# Patient Record
Sex: MALE | Race: WHITE | NOT HISPANIC OR LATINO | URBAN - METROPOLITAN AREA
[De-identification: names, ages, dates, MRNs, and addresses within clinical notes are randomized per-mention and may not be internally consistent; named-entity substitution may affect disease eponyms.]

---

## 2019-08-15 ENCOUNTER — TELEPHONE (OUTPATIENT)
Dept: NEUROLOGY | Facility: CLINIC | Age: 59
End: 2019-08-15

## 2023-08-01 ENCOUNTER — OFFICE VISIT (OUTPATIENT)
Age: 63
End: 2023-08-01
Payer: COMMERCIAL

## 2023-08-01 VITALS
HEART RATE: 63 BPM | BODY MASS INDEX: 33.9 KG/M2 | HEIGHT: 67 IN | SYSTOLIC BLOOD PRESSURE: 130 MMHG | OXYGEN SATURATION: 97 % | DIASTOLIC BLOOD PRESSURE: 82 MMHG | WEIGHT: 216 LBS

## 2023-08-01 DIAGNOSIS — R15.1 FECAL SOILING: ICD-10-CM

## 2023-08-01 DIAGNOSIS — L29.0 PRURITUS ANI: Primary | ICD-10-CM

## 2023-08-01 PROCEDURE — 99213 OFFICE O/P EST LOW 20 MIN: CPT | Performed by: COLON & RECTAL SURGERY

## 2023-08-01 RX ORDER — OMEPRAZOLE 40 MG/1
40 CAPSULE, DELAYED RELEASE ORAL DAILY
COMMUNITY
Start: 2023-07-27

## 2023-08-01 RX ORDER — DOXEPIN HYDROCHLORIDE 10 MG/1
CAPSULE ORAL
COMMUNITY
Start: 2023-06-28

## 2023-08-01 RX ORDER — ZOLPIDEM TARTRATE 10 MG/1
TABLET ORAL
COMMUNITY
Start: 2023-06-23

## 2023-08-01 RX ORDER — CITALOPRAM HYDROBROMIDE 10 MG/1
TABLET ORAL
COMMUNITY
Start: 2023-07-30

## 2023-08-01 RX ORDER — AZELASTINE HYDROCHLORIDE 137 UG/1
SPRAY, METERED NASAL
COMMUNITY
Start: 2023-06-23

## 2023-08-01 RX ORDER — ASPIRIN 81 MG/1
TABLET ORAL
COMMUNITY

## 2023-08-01 RX ORDER — MONTELUKAST SODIUM 10 MG/1
10 TABLET ORAL EVERY EVENING
COMMUNITY
Start: 2023-06-23

## 2023-08-01 RX ORDER — TRIAMCINOLONE ACETONIDE 1 MG/G
CREAM TOPICAL 2 TIMES DAILY
Qty: 30 G | Refills: 0 | Status: SHIPPED | OUTPATIENT
Start: 2023-08-01

## 2023-08-01 RX ORDER — METHYLCELLULOSE 500 MG/1
TABLET ORAL
COMMUNITY

## 2023-08-01 RX ORDER — LOSARTAN POTASSIUM 25 MG/1
25 TABLET ORAL EVERY MORNING
COMMUNITY
Start: 2023-06-23

## 2023-08-01 RX ORDER — PSEUDOEPHEDRINE HCL 30 MG
100 TABLET ORAL 3 TIMES DAILY PRN
COMMUNITY
Start: 2022-12-13 | End: 2023-12-13

## 2023-08-01 RX ORDER — ROSUVASTATIN CALCIUM 20 MG/1
20 TABLET, COATED ORAL DAILY
COMMUNITY
Start: 2022-11-29 | End: 2023-11-29

## 2023-08-01 NOTE — PROGRESS NOTES
Subjective:    Patient is a 56-year-old man status post hemorrhoidectomy for internal/external hemorrhoids. The patient states that he has had some mild fecal soiling some staining in his underwear for the last couple of months with irritation involving the anal margin skin. Objective: Inspection of the anal margin reveals mild pruritus ani gluteal cleft to the base of the scrotal area some mild variation from. Sphincter tone strong without palpable defect  Assessment:    The patient is with rectal after-drainage with secondary irritation of skin. Plan:    Patient instructed in local anal hygiene. Patient advised to irrigate the rectum with a bulb syringe to remove any residual fecal mucoid material that is not discharged at the time of the bowel evacuation. Aristocort cream combined with A&E ointment twice daily    Increase of soluble fiber to 20 to 30 mg/day suggested Regulo seeds 2 tablespoons daily and Citrucel caplets 2 p.o. twice daily.

## 2023-11-21 ENCOUNTER — OFFICE VISIT (OUTPATIENT)
Age: 63
End: 2023-11-21
Payer: COMMERCIAL

## 2023-11-21 VITALS
BODY MASS INDEX: 32.96 KG/M2 | SYSTOLIC BLOOD PRESSURE: 121 MMHG | DIASTOLIC BLOOD PRESSURE: 80 MMHG | HEART RATE: 67 BPM | HEIGHT: 67 IN | WEIGHT: 210 LBS | OXYGEN SATURATION: 99 %

## 2023-11-21 DIAGNOSIS — N40.1 BPH ASSOCIATED WITH NOCTURIA: Primary | ICD-10-CM

## 2023-11-21 DIAGNOSIS — R31.0 GROSS HEMATURIA: ICD-10-CM

## 2023-11-21 DIAGNOSIS — R35.1 BPH ASSOCIATED WITH NOCTURIA: Primary | ICD-10-CM

## 2023-11-21 PROCEDURE — 99213 OFFICE O/P EST LOW 20 MIN: CPT | Performed by: COLON & RECTAL SURGERY

## 2023-11-21 NOTE — PROGRESS NOTES
Assessment/Plan:  Anal skin tag no therapy recommended  Patient previously with a history of rectal after drainage currently controlled with use of fiber and 1 to 2 mg of Imodium per day. The patient is with. Little bit of dysuria mild enlargement of prostate with occasional hematuria. Plan patient will have a PSA urine C&S, referral made to [facility] urology for evaluation and treatment of his  complaints       Diagnoses and all orders for this visit:    BPH associated with nocturia  -     Ambulatory referral to Urology; Future  -     UA w Reflex to Microscopic w Reflex to Culture  -     PSA, Total Screen; Future    Gross hematuria          Subjective:      Patient ID: Bubba Purdy is a 59 y.o. male. HPI    The patient is a 59-year-old man status post greater than 6 months anterolateral left anal sphincterotomy for chronic anal fissure. The patient complains today of a minor skin tag anal margin that he wanted checked. The patient does also complain of some hematuria and frequent nocturia getting up 5-6 times per evening. The patient's previous rectal after drainage was controlled with the use of Imodium a milligram per day    The following portions of the patient's history were reviewed and updated as appropriate: allergies, current medications, past family history, past medical history, past social history, past surgical history, and problem list.    Review of Systems   Constitutional:  Negative for chills and fever. HENT:  Negative for ear pain and sore throat. Eyes:  Negative for pain and visual disturbance. Respiratory:  Negative for cough and shortness of breath. Cardiovascular:  Negative for chest pain and palpitations. Gastrointestinal:  Negative for abdominal pain and vomiting. Genitourinary:  Positive for difficulty urinating, dysuria, frequency and hematuria. Musculoskeletal:  Negative for arthralgias and back pain. Skin:  Negative for color change and rash. Neurological:  Negative for seizures and syncope. All other systems reviewed and are negative. Objective:      /80   Pulse 67   Ht 5' 7" (1.702 m)   Wt 95.3 kg (210 lb)   SpO2 99%   BMI 32.89 kg/m²          Physical Exam   HENT:   Head: Normocephalic and atraumatic. Eyes: Conjunctivae are normal.   Abdominal: Soft. Normal appearance and bowel sounds are normal.   Genitourinary:    Genitourinary Comments: Anus reveals an anterior midline skin tag minor 1 to 2 mm. In the left lateral position the anterolateral sphincterotomy incision is completely healed previous anal fissure completely healed. ELLIE reveals enlarged prostate no nodules appreciated     Neurological: He is alert. Skin: Skin is warm and dry.

## 2023-12-04 ENCOUNTER — TELEPHONE (OUTPATIENT)
Age: 63
End: 2023-12-04

## 2023-12-04 NOTE — TELEPHONE ENCOUNTER
Patients GI provider:  Dr. Yovany Berry    Number to return call: [phone]    Reason for call: Patient calling requesting to speak with provider. Patient states that he recently saw provider and did not get an answer to his question. Asked patient what his question was he had for the provider. Patient states it was in regards to his previous procedure but could not disclose over the phone to me. Did offer to schedule an appointment with the provider.  Patient declined and states provider has called him in the past.     Scheduled procedure/appointment date if applicable: N/A

## 2024-07-24 ENCOUNTER — PREP FOR PROCEDURE (OUTPATIENT)
Age: 64
End: 2024-07-24

## 2024-07-24 ENCOUNTER — OFFICE VISIT (OUTPATIENT)
Age: 64
End: 2024-07-24
Payer: COMMERCIAL

## 2024-07-24 VITALS
BODY MASS INDEX: 33.12 KG/M2 | OXYGEN SATURATION: 98 % | HEIGHT: 67 IN | WEIGHT: 211 LBS | HEART RATE: 74 BPM | SYSTOLIC BLOOD PRESSURE: 123 MMHG | DIASTOLIC BLOOD PRESSURE: 78 MMHG

## 2024-07-24 DIAGNOSIS — K64.4 RESIDUAL HEMORRHOIDAL SKIN TAGS: ICD-10-CM

## 2024-07-24 DIAGNOSIS — L29.0 PRURITUS ANI: Primary | ICD-10-CM

## 2024-07-24 DIAGNOSIS — R15.9 FECAL SOILING DUE TO FECAL INCONTINENCE: ICD-10-CM

## 2024-07-24 DIAGNOSIS — K62.89 HYPERTROPHIED ANAL PAPILLA: ICD-10-CM

## 2024-07-24 PROCEDURE — 46600 DIAGNOSTIC ANOSCOPY SPX: CPT | Performed by: COLON & RECTAL SURGERY

## 2024-07-24 PROCEDURE — 99215 OFFICE O/P EST HI 40 MIN: CPT | Performed by: COLON & RECTAL SURGERY

## 2024-07-24 RX ORDER — CLOTRIMAZOLE AND BETAMETHASONE DIPROPIONATE 10; .64 MG/G; MG/G
CREAM TOPICAL 2 TIMES DAILY
Qty: 45 G | Refills: 1 | Status: SHIPPED | OUTPATIENT
Start: 2024-07-24

## 2024-07-24 NOTE — PROGRESS NOTES
Ambulatory Visit  Name: Minh Brewer      : 1960      MRN: [id number]  Encounter Provider: Kwaku Xiong MD  Encounter Date: 2024   Encounter department: [facility] [place]    Assessment & Plan   1. Pruritus ani  -     clotrimazole-betamethasone (LOTRISONE) 1-0.05 % cream; Apply topically 2 (two) times a day  -     Lower Endoscopy  2. Residual hemorrhoidal skin tags  -     Case request operating room: HEMORRHOIDECTOMY EXCISION; Standing  -     Case request operating room: HEMORRHOIDECTOMY EXCISION  -     Lower Endoscopy  3. Hypertrophied anal papilla  -     Case request operating room: HEMORRHOIDECTOMY EXCISION; Standing  -     Case request operating room: HEMORRHOIDECTOMY EXCISION  -     Lower Endoscopy  4. Fecal soiling due to fecal incontinence  -     Lower Endoscopy      History of Present Illness     Minh Brewer is a 64 y.o. male who presents for evaluation and treatment of his anal complaints.  The patient complains of a sensation of something moving up and down the anal canal during time of defecation.  The patient has taken fiber tablets only 2/day he does complain of some minor fecal incontinence when he passes gas with some pruritus ani.  Patient presents for evaluation and treatment of his complaints    Review of Systems   Constitutional:  Negative for chills and fever.   HENT:  Negative for ear pain and sore throat.    Eyes:  Negative for pain and visual disturbance.   Respiratory:  Negative for cough and shortness of breath.    Cardiovascular:  Negative for chest pain and palpitations.   Gastrointestinal:  Negative for abdominal pain and vomiting.         minor fecal incontinence   Genitourinary:  Negative for dysuria and hematuria.   Musculoskeletal:  Negative for arthralgias and back pain.   Skin:  Negative for color change and rash.   Neurological:  Negative for seizures and syncope.   All other systems reviewed and are  "negative.    Medical History Reviewed by provider this encounter:  Tobacco  Allergies  Meds  Problems  Med Hx  Surg Hx  Fam Hx       Objective     /78   Pulse 74   Ht 5' 7\" (1.702 m)   Wt 95.7 kg (211 lb)   SpO2 98%   BMI 33.05 kg/m²     Physical Exam  Vitals and nursing note reviewed.   Constitutional:       General: He is not in acute distress.     Appearance: He is well-developed.   HENT:      Head: Normocephalic and atraumatic.   Eyes:      Conjunctiva/sclera: Conjunctivae normal.   Cardiovascular:      Rate and Rhythm: Normal rate and regular rhythm.      Heart sounds: No murmur heard.  Pulmonary:      Effort: Pulmonary effort is normal. No respiratory distress.      Breath sounds: Normal breath sounds.   Abdominal:      Palpations: Abdomen is soft.      Tenderness: There is no abdominal tenderness.   Musculoskeletal:         General: No swelling.      Cervical back: Neck supple.   Skin:     General: Skin is warm and dry.      Capillary Refill: Capillary refill takes less than 2 seconds.   Neurological:      Mental Status: He is alert and oriented to person, place, and time.   Psychiatric:         Mood and Affect: Mood normal.         Behavior: Behavior normal.     Lower Endoscopy    Date/Time: 7/24/2024 1:20 PM    Performed by: Kwaku Xiong MD  Authorized by: Kwaku Xiong MD    Verbal consent obtained?: Yes    Written consent obtained?: No    Risks and benefits: Risks, benefits and alternatives were discussed    Consent given by:  Patient  Patient states understanding of procedure being performed: Yes    Patient identity confirmed:  Verbally with patient  Indications comment:  Anal irritation, prolapsing anal lesion  Patient sedated: No    Scope type:  Anoscope  External exam performed: Yes    External hemorrhoid: Patient without active external hemorrhoidal disease.  There is a small skin tag involving the right lateral anal margin.  There is scarring in the left lateral anal " margin from previous internal lateral anal sphincterotomy sphincterotomy.    Internal hemorrhoids: Yes (The patient has no active internal hemorrhoidal disease however the patient does have a hypertrophied anal papilla right lateral quadrant that is prolapsing caudally and cephalad within the anal canal)    Intraluminal mass: No    Inflammation: No    Anal fissures: No    Anal fistulae: No    Anal stricture: No    Abscess: No        Administrative Statements   I have spent a total time of 45 minutes in caring for this patient on the day of the visit/encounter including Diagnostic results, Risks and benefits of tx options, Patient and family education, Impressions, Documenting in the medical record, and Reviewing / ordering tests, medicine, procedures  .

## 2024-07-25 ENCOUNTER — ANESTHESIA EVENT (OUTPATIENT)
Dept: PERIOP | Facility: HOSPITAL | Age: 64
End: 2024-07-25
Payer: COMMERCIAL

## 2024-07-25 PROBLEM — Z98.61 HISTORY OF PTCA: Status: ACTIVE | Noted: 2024-07-25

## 2024-07-25 PROBLEM — I25.10 CAD (CORONARY ARTERY DISEASE): Status: ACTIVE | Noted: 2024-07-25

## 2024-07-25 NOTE — ANESTHESIA PREPROCEDURE EVALUATION
Procedure:  HEMORRHOIDECTOMY EXCISION (Anus)    Relevant Problems   CARDIO   (+) CAD (coronary artery disease)   (+) History of PTCA      PULMONARY   (+) Sleep apnea treated with continuous positive airway pressure (CPAP)   Cardiac cath in 2008, follows with Don. Had a septal myomectomy for HOCM at [facility] many years ago and coronary stenting [place]. Reports that his most recent stress tests with Don were normal.    Seen at [facility] ED earlier this month for AMS, workup negative at that time. Reports that he felt back to normal after a few days.    Non compliant with CPAP      Physical Exam    Airway    Mallampati score: III  TM Distance: >3 FB  Neck ROM: full     Dental   Comment: Denies loose teeth     Cardiovascular  Cardiovascular exam normal    Pulmonary  Pulmonary exam normal     Other Findings  Portions of exam deferred due to low yield and/or unknown COVID status      Anesthesia Plan  ASA Score- 3     Anesthesia Type- IV sedation with anesthesia with ASA Monitors.         Additional Monitors:     Airway Plan:            Plan Factors-Exercise tolerance (METS): >4 METS.    Chart reviewed.   Existing labs reviewed. Patient summary reviewed.    Patient is not a current smoker.              Induction- intravenous.    Postoperative Plan-         Informed Consent- Anesthetic plan and risks discussed with patient.  I personally reviewed this patient with the CRNA. Discussed and agreed on the Anesthesia Plan with the CRNA..

## 2024-07-25 NOTE — PRE-PROCEDURE INSTRUCTIONS
Pre-Surgery Instructions:   Medication Instructions    aspirin (Aspirin 81) 81 mg EC tablet Instructions provided by Earle 7/24    clotrimazole-betamethasone (LOTRISONE) 1-0.05 % cream Hold day of surgery.    cyclobenzaprine (FLEXERIL) 5 mg tablet Uses PRN- OK to take day of surgery    Eye Allergy Itch Relief 0.2 % opth drops Take day of surgery.    methylcellulose (Citrucel) 500 mg tablet Uses PRN- DO NOT take day of surgery    omeprazole (PriLOSEC) 40 MG capsule Take day of surgery.    rosuvastatin (CRESTOR) 20 MG tablet Take day of surgery.    Medication instructions for day surgery reviewed. Please use only a sip of water to take your instructed medications. Avoid all over the counter vitamins, supplements and NSAIDS for one week prior to surgery per anesthesia guidelines. Tylenol is ok to take as needed.     You will receive a call one business day prior to surgery with an arrival time and hospital directions. If your surgery is scheduled on a Monday, the hospital will be calling you on the Friday prior to your surgery. If you have not heard from anyone by 8pm, please call the hospital supervisor through the hospital  at [phone]. ([place] [phone] or [place] [phone]).    Do not eat or drink anything after midnight the night before your surgery, including candy, mints, lifesavers, or chewing gum. Do not drink alcohol 24hrs before your surgery. Try not to smoke at least 24hrs before your surgery.       Follow the pre surgery showering instructions as listed in the “My Surgical Experience Booklet” or otherwise provided by your surgeon's office. Do not use a blade to shave the surgical area 1 week before surgery. It is okay to use a clean electric clippers up to 24 hours before surgery. Do not apply any lotions, creams, including makeup, cologne, deodorant, or perfumes after showering on the day of your surgery. Do not use dry shampoo, hair spray, hair gel, or any type of hair products.      No contact lenses, eye make-up, or artificial eyelashes. Remove nail polish, including gel polish, and any artificial, gel, or acrylic nails if possible. Remove all jewelry including rings and body piercing jewelry.     Wear causal clothing that is easy to take on and off. Consider your type of surgery.    Keep any valuables, jewelry, piercings at home. Please bring any specially ordered equipment (sling, braces) if indicated.    Arrange for a responsible person to drive you to and from the hospital on the day of your surgery. Please confirm the visitor policy for the day of your procedure when you receive your phone call with an arrival time.     Call the surgeon's office with any new illnesses, exposures, or additional questions prior to surgery.    Please reference your “My Surgical Experience Booklet” for additional information to prepare for your upcoming surgery.

## 2024-07-26 ENCOUNTER — ANESTHESIA (OUTPATIENT)
Dept: PERIOP | Facility: HOSPITAL | Age: 64
End: 2024-07-26
Payer: COMMERCIAL

## 2024-07-26 ENCOUNTER — HOSPITAL ENCOUNTER (OUTPATIENT)
Facility: HOSPITAL | Age: 64
Setting detail: OUTPATIENT SURGERY
Discharge: HOME | End: 2024-07-26
Attending: COLON & RECTAL SURGERY | Admitting: COLON & RECTAL SURGERY
Payer: COMMERCIAL

## 2024-07-26 VITALS
SYSTOLIC BLOOD PRESSURE: 140 MMHG | WEIGHT: 211.42 LBS | RESPIRATION RATE: 16 BRPM | HEIGHT: 67 IN | BODY MASS INDEX: 33.18 KG/M2 | DIASTOLIC BLOOD PRESSURE: 90 MMHG | OXYGEN SATURATION: 99 % | HEART RATE: 76 BPM | TEMPERATURE: 97.9 F

## 2024-07-26 DIAGNOSIS — K62.89 HYPERTROPHIED ANAL PAPILLA: ICD-10-CM

## 2024-07-26 DIAGNOSIS — K64.4 RESIDUAL HEMORRHOIDAL SKIN TAGS: ICD-10-CM

## 2024-07-26 DIAGNOSIS — G89.18 POSTOPERATIVE PAIN: Primary | ICD-10-CM

## 2024-07-26 PROCEDURE — 46922 EXCISION OF ANAL LESION(S): CPT | Performed by: COLON & RECTAL SURGERY

## 2024-07-26 PROCEDURE — NC001 PR NO CHARGE: Performed by: COLON & RECTAL SURGERY

## 2024-07-26 PROCEDURE — 88304 TISSUE EXAM BY PATHOLOGIST: CPT | Performed by: PATHOLOGY

## 2024-07-26 PROCEDURE — C9290 INJ, BUPIVACAINE LIPOSOME: HCPCS | Performed by: COLON & RECTAL SURGERY

## 2024-07-26 RX ORDER — OXYCODONE HYDROCHLORIDE AND ACETAMINOPHEN 5; 325 MG/1; MG/1
1 TABLET ORAL EVERY 4 HOURS PRN
Status: CANCELLED | OUTPATIENT
Start: 2024-07-26

## 2024-07-26 RX ORDER — BUPIVACAINE HYDROCHLORIDE AND EPINEPHRINE 2.5; 5 MG/ML; UG/ML
30 INJECTION, SOLUTION EPIDURAL; INFILTRATION; INTRACAUDAL; PERINEURAL ONCE
Status: CANCELLED | OUTPATIENT
Start: 2024-07-26 | End: 2024-07-26

## 2024-07-26 RX ORDER — MIDAZOLAM HYDROCHLORIDE 2 MG/2ML
INJECTION, SOLUTION INTRAMUSCULAR; INTRAVENOUS AS NEEDED
Status: DISCONTINUED | OUTPATIENT
Start: 2024-07-26 | End: 2024-07-26

## 2024-07-26 RX ORDER — GINSENG 100 MG
CAPSULE ORAL AS NEEDED
Status: DISCONTINUED | OUTPATIENT
Start: 2024-07-26 | End: 2024-07-26 | Stop reason: HOSPADM

## 2024-07-26 RX ORDER — PROPOFOL 10 MG/ML
INJECTION, EMULSION INTRAVENOUS CONTINUOUS PRN
Status: DISCONTINUED | OUTPATIENT
Start: 2024-07-26 | End: 2024-07-26

## 2024-07-26 RX ORDER — SODIUM CHLORIDE, SODIUM LACTATE, POTASSIUM CHLORIDE, CALCIUM CHLORIDE 600; 310; 30; 20 MG/100ML; MG/100ML; MG/100ML; MG/100ML
125 INJECTION, SOLUTION INTRAVENOUS CONTINUOUS
Status: DISCONTINUED | OUTPATIENT
Start: 2024-07-26 | End: 2024-07-26 | Stop reason: HOSPADM

## 2024-07-26 RX ORDER — FENTANYL CITRATE 50 UG/ML
INJECTION, SOLUTION INTRAMUSCULAR; INTRAVENOUS AS NEEDED
Status: DISCONTINUED | OUTPATIENT
Start: 2024-07-26 | End: 2024-07-26

## 2024-07-26 RX ORDER — LIDOCAINE HYDROCHLORIDE 20 MG/ML
INJECTION, SOLUTION EPIDURAL; INFILTRATION; INTRACAUDAL; PERINEURAL AS NEEDED
Status: DISCONTINUED | OUTPATIENT
Start: 2024-07-26 | End: 2024-07-26

## 2024-07-26 RX ORDER — MAGNESIUM HYDROXIDE 1200 MG/15ML
LIQUID ORAL AS NEEDED
Status: DISCONTINUED | OUTPATIENT
Start: 2024-07-26 | End: 2024-07-26 | Stop reason: HOSPADM

## 2024-07-26 RX ORDER — OXYCODONE HYDROCHLORIDE 10 MG/1
10 TABLET ORAL EVERY 4 HOURS PRN
Qty: 30 TABLET | Refills: 0 | Status: SHIPPED | OUTPATIENT
Start: 2024-07-26

## 2024-07-26 RX ORDER — PROPOFOL 10 MG/ML
INJECTION, EMULSION INTRAVENOUS AS NEEDED
Status: DISCONTINUED | OUTPATIENT
Start: 2024-07-26 | End: 2024-07-26

## 2024-07-26 RX ADMIN — PROPOFOL 50 MCG/KG/MIN: 10 INJECTION, EMULSION INTRAVENOUS at 08:25

## 2024-07-26 RX ADMIN — DEXMEDETOMIDINE HYDROCHLORIDE 10 MCG: 100 INJECTION, SOLUTION INTRAVENOUS at 08:17

## 2024-07-26 RX ADMIN — FENTANYL CITRATE 100 MCG: 50 INJECTION INTRAMUSCULAR; INTRAVENOUS at 08:20

## 2024-07-26 RX ADMIN — MIDAZOLAM 2 MG: 1 INJECTION INTRAMUSCULAR; INTRAVENOUS at 08:17

## 2024-07-26 RX ADMIN — LIDOCAINE HYDROCHLORIDE 100 MG: 20 INJECTION, SOLUTION EPIDURAL; INFILTRATION; INTRACAUDAL at 08:23

## 2024-07-26 RX ADMIN — PROPOFOL 25 MG: 10 INJECTION, EMULSION INTRAVENOUS at 08:24

## 2024-07-26 NOTE — ANESTHESIA POSTPROCEDURE EVALUATION
Post-Op Assessment Note    CV Status:  Stable  Pain Score: 0    Pain management: adequate       Mental Status:  Alert and awake   Hydration Status:  Euvolemic   PONV Controlled:  Controlled   Airway Patency:  Patent     Post Op Vitals Reviewed: Yes    Anethesia notable event occurred.    Staff: CRNA               BP   142/81   Temp 97.9   Pulse 78   Resp 13   SpO2 98       Notable event - Epistaxis with nasal airway insertion. Device removed, sedation turned off,  airway remained patent. Bleeding stopped in less than 5 minutes via gauze. Pt was informed of events in recovery. Above was tolerated well.

## 2024-07-26 NOTE — DISCHARGE INSTR - AVS FIRST PAGE
Tylenol 500 mg 2 tablets p.o. every 6 hours x 5 days then as needed pain    MiraLAX 1 dose p.o. twice daily until BM then as needed    Oxycodone 10 mg p.o. every 4 hours as needed severe pain    Sitz bath after BM and every 4-6 hours as needed anal discomfort    Bacitracin or Neosporin ointment to anal margin skin twice daily    Change dressing as needed    Citrucel caplets 2 p.o. twice daily    Colace 100 mg p.o. daily    Follow-up Dr. Xiong's office tomorrow 10:00    Dr. Xiong cell number for postoperative questions [phone]

## 2024-07-26 NOTE — H&P
History and Physical   Colon and Rectal Surgery   Minh Brewer 64 y.o. male MRN: [id number]  Unit/Bed#: OR [place] Encounter: [id number]  07/26/24   7:41 AM      No chief complaint on file.        History of Present Illness   HPI:  Minh Brewer is a 64 y.o. male who presents with anal papilla       Historical Information   Past Medical History:   Diagnosis Date    Anxiety     Chronic pain disorder     Heart disease     History of colon polyps     Hyperlipidemia     Hypertension     Insomnia     Kidney disease     Kidney stone     Kidney stones     Psychiatric disorder     Seasonal allergies     Sleep apnea treated with continuous positive airway pressure (CPAP)      Past Surgical History:   Procedure Laterality Date    ANAL SPHINCTEROTOMY  12/12/2022    CORONARY ANGIOPLASTY WITH STENT PLACEMENT      x4    CYSTOSCOPY W/ LASER LITHOTRIPSY      HEMORRHOID SURGERY  12/12/2022    KIDNEY STONE SURGERY      KNEE ARTHROSCOPY      US GUIDED THYROID BIOPSY  11/11/2022       Meds/Allergies       Medications Prior to Admission:     aspirin (Aspirin 81) 81 mg EC tablet    clotrimazole-betamethasone (LOTRISONE) 1-0.05 % cream    cyclobenzaprine (FLEXERIL) 5 mg tablet    Eye Allergy Itch Relief 0.2 % opth drops    methylcellulose (Citrucel) 500 mg tablet    omeprazole (PriLOSEC) 40 MG capsule    rosuvastatin (CRESTOR) 20 MG tablet    ALPRAZolam (XANAX) 1 mg tablet    bisoprolol (ZEBETA) 5 mg tablet    BISOPROLOL FUMARATE PO    citalopram (CeleXA) 10 mg tablet    diclofenac (VOLTAREN) 75 mg EC tablet    Docusate Sodium (DSS) 100 MG CAPS    doxepin (SINEquan) 10 mg capsule    hyoscyamine (LEVSIN/SL) 0.125 mg SL tablet    losartan (COZAAR) 25 mg tablet    methylPREDNISolone 4 MG tablet therapy pack    metoprolol succinate (Toprol XL) 25 mg 24 hr tablet    montelukast (SINGULAIR) 10 mg tablet    prazosin (MINIPRESS) 1 mg capsule    simvastatin (ZOCOR) 10 mg tablet    traZODone (DESYREL) 50 mg tablet      Current  "Facility-Administered Medications:     bupivacaine liposomal (EXPAREL) 1.3 % injection 20 mL, 20 mL, Infiltration, Once, Kwaku Xiong MD    lactated ringers infusion, 125 mL/hr, Intravenous, Continuous, Merissa Montoya MD    Allergies   Allergen Reactions    Contrast [Iodinated Contrast Media] Anaphylaxis         Social History   Social History     Substance and Sexual Activity   Alcohol Use None     Social History     Substance and Sexual Activity   Drug Use Never     Social History     Tobacco Use   Smoking Status Never   Smokeless Tobacco Never         Family History:   Family History   Problem Relation Age of Onset    Kidney disease Mother     Diabetes Mother     Diabetes Father     Colon polyps Father     Colon polyps Brother          Objective     Current Vitals:   Height: 5' 7\" (170.2 cm) (07/25/24 1142)  Weight - Scale: 95.7 kg (211 lb) (07/25/24 1142)  No intake or output data in the 24 hours ending 07/26/24 0741    Physical Exam:  General:no distress  Eyes:perrla/eomi  ENT:moist mucus membranes  Neck:supple  Pulm:no increased work of breathing  CV:sinus  Abdomen:soft,nontender  Rectal:normal perianal skin/sphincter tone, no masses palpated  Extremities:no edema  Lymphatics:no neck/axillary/groin lymphadenopathy          Lab Results: I have personally reviewed pertinent lab results.    Imaging: I have personally reviewed pertinent reports.        ASSESSMENT:  Minh Brewer is a 64 y.o. male who presents with anal papilla anal skin tag ,with external hemorrhoid.      PLAN:  Excision papilla /hemorrhoids/skin tag    "

## 2024-07-26 NOTE — OP NOTE
Colon Rectal Surgery Postoperative Note    PATIENT NAME: Minh Brewer  : 1960  MRN: [id number]    [facility] OR ROOM 01    Surgery Date: 2024    Residual hemorrhoidal skin tags [K64.4]  Hypertrophied anal papilla [K62.89]    Post-Op Diagnosis Codes:     * Residual hemorrhoidal skin tags [K64.4]     * Hypertrophied anal papilla [K62.89]    Procedure(s):  -HEMORRHOIDECTOMY EXCISION residual right lateral anal skin tag    -Excision of right lateral anal papilla    -Excision of residual left lateral external hemorrhoidal skin tag    Surgeons and Role:     * Kwaku Xiong MD - Primary    Specimens:  ID Type Source Tests Collected by Time Destination   1 : RIGHT LATERAL ANAL PAPILLA Tissue Anus TISSUE EXAM Kwaku Xiong MD 2024  8:46 AM    2 : RIGHT ANTERIOR LATERAL EXTERNAL SKIN TAG Tissue Anus TISSUE EXAM Kwaku Xiong MD 2024  8:46 AM    3 : LEFT LATERAL EXTERNAL SKIN TAG Tissue Anus TISSUE EXAM Kwaku Xiong MD 2024  8:47 AM          Fluids:     Estimated Blood Loss:   Minimal    Urine: NA    Anesthesia Type:   IV Sedation with Anesthesia     Findings:   Right right anterior anal papilla    Right anterior lateral residual hemorrhoidal skin tag.    Left anterior lateral residual hemorrhoidal skin tag    Procedure Details:   The patient was brought to the [facility] operating room identified by me as YAA Brewer.  He identified me his operating surgeon.  Informed consent was obtained prior to coming to the operating room.  The patient was placed on the operating table in a prone position timeouts were done x 2.  Once completed the patient was prepped and draped in a sterile manner after the buttocks had been taped apart.  The patient was anesthetized and then a perianal field block was done by Dr. Xiong with 20 cc of Exparel and 30 cc of 0.25% Marcaine 1-200,000 of epinephrine.  A medium Rodriguez retractor was placed within the anal canal the right lateral anal papilla  was identified.  The papilla was grasped with a hemostat and a 0 chromic Carolina stitch was placed at its base.  The papilla was amputated.  The base was inspected for hemostasis found to be hemostatic.  The Rodriguez retractor was removed and the tape tension on the buttocks relaxed.  This allowed the skin tags to be more visible.  The right anterior lateral skin tags were grasped with a hemostat from the anal verge to the margin removed with a 15 scalpel.  The base was made hemostatic with use of the electrocautery.  The dermal layer was closed with a running continuous 0 chromic ligature.  The epidermal skin was left open to heal by secondary intention.  The left anterior lateral anal margin skin tag was removed in the same manner using the same surgical technique.  All wounds were treated with bacitracin ointment.  Prior to placing dressing the Rodriguez retractor was placed in the anal canal and the area of excision of the papilla was reviewed and found to be completely hemostatic.  Sponge needle and instrument counts were complete upon conclusion of the procedure.      Complications:   None    SIGNATURE: Kwaku Xiong MD   DATE: July 26, 2024   TIME: 8:56 AM

## 2024-07-27 ENCOUNTER — DOCUMENTATION (OUTPATIENT)
Age: 64
End: 2024-07-27

## 2024-07-27 DIAGNOSIS — Z48.89 ENCOUNTER FOR POSTOPERATIVE WOUND CHECK: Primary | ICD-10-CM

## 2024-07-27 NOTE — PROGRESS NOTES
Assessment/Plan:   Inspection of the anal margin stable postop day 1 no complications.    Patient's postoperative pain controlled.    Patient will follow-up in 7 to 10 days for recheck.  All patient questions answered       Diagnoses and all orders for this visit:    Encounter for postoperative wound check          Subjective:     Patient ID: Minh Brewer is a 64 y.o. male.    HPI  Patient status post excision anal papilla residual external hemorrhoidal skin tags.  Postop day 1  Review of Systems   Constitutional:  Negative for chills and fever.   HENT:  Negative for ear pain and sore throat.    Eyes:  Negative for pain and visual disturbance.   Respiratory:  Negative for cough and shortness of breath.    Cardiovascular:  Negative for chest pain and palpitations.   Gastrointestinal:  Negative for abdominal pain and vomiting.   Genitourinary:  Negative for dysuria and hematuria.   Musculoskeletal:  Negative for arthralgias and back pain.   Skin:  Negative for color change and rash.   Neurological:  Negative for seizures and syncope.   All other systems reviewed and are negative.        Objective:     Colorectal Physical Exam@      Inspection of the anal margin reveals suture lines to be intact there is no evidence of cellulitis or infection.

## 2024-07-31 ENCOUNTER — OFFICE VISIT (OUTPATIENT)
Age: 64
End: 2024-07-31

## 2024-07-31 VITALS
BODY MASS INDEX: 33.12 KG/M2 | DIASTOLIC BLOOD PRESSURE: 77 MMHG | OXYGEN SATURATION: 93 % | SYSTOLIC BLOOD PRESSURE: 119 MMHG | WEIGHT: 211 LBS | HEIGHT: 67 IN | HEART RATE: 76 BPM

## 2024-07-31 DIAGNOSIS — Z48.89 ENCOUNTER FOR POSTOPERATIVE WOUND CHECK: Primary | ICD-10-CM

## 2024-07-31 PROCEDURE — 88304 TISSUE EXAM BY PATHOLOGIST: CPT | Performed by: PATHOLOGY

## 2024-07-31 PROCEDURE — 99024 POSTOP FOLLOW-UP VISIT: CPT | Performed by: COLON & RECTAL SURGERY

## 2024-07-31 NOTE — PROGRESS NOTES
Assessment/Plan:   Postop day 5 anorectal surgery.  Patient currently stable no complications postsurgery.    Plan: Patient will follow-up in approximately 2 weeks for recheck       Diagnoses and all orders for this visit:    Encounter for postoperative wound check          Subjective:     Patient ID: Minh Brewer is a 64 y.o. male.    HPI  Patient is a 64-year-old man with recent anorectal surgery for removal of residual anal skin tags and papilla anal canal.  Patient presents today for wound recheck.  The patient has moved his bowels without incident the patient is voiding without incident.  Pain is controlled with current analgesic regimen  Review of Systems   Constitutional:  Negative for chills and fever.   HENT:  Negative for ear pain and sore throat.    Eyes:  Negative for pain and visual disturbance.   Respiratory:  Negative for cough and shortness of breath.    Cardiovascular:  Negative for chest pain and palpitations.   Gastrointestinal:  Negative for abdominal pain and vomiting.   Genitourinary:  Negative for dysuria and hematuria.   Musculoskeletal:  Negative for arthralgias and back pain.   Skin:  Negative for color change and rash.   Neurological:  Negative for seizures and syncope.   All other systems reviewed and are negative.        Objective:     Colorectal Physical Exam@      Inspection of the anal margin reveals a suture lines to be intact there is no evidence of infection or cellulitis.

## 2024-08-13 ENCOUNTER — OFFICE VISIT (OUTPATIENT)
Age: 64
End: 2024-08-13

## 2024-08-13 VITALS
WEIGHT: 211 LBS | HEART RATE: 63 BPM | DIASTOLIC BLOOD PRESSURE: 69 MMHG | HEIGHT: 67 IN | BODY MASS INDEX: 33.12 KG/M2 | SYSTOLIC BLOOD PRESSURE: 112 MMHG | OXYGEN SATURATION: 97 %

## 2024-08-13 DIAGNOSIS — Z48.89 ENCOUNTER FOR POSTOPERATIVE WOUND CHECK: Primary | ICD-10-CM

## 2024-08-13 PROCEDURE — 99024 POSTOP FOLLOW-UP VISIT: CPT | Performed by: COLON & RECTAL SURGERY

## 2024-08-14 NOTE — PROGRESS NOTES
Assessment/Plan:   Patient advised to continue high-fiber supplement local wound care.  Patient will follow-up in the office as needed       Diagnoses and all orders for this visit:    Encounter for postoperative wound check          Subjective:     Patient ID: Minh Brewer is a 64 y.o. male.    HPI  The patient is status post excision of external anal residual skin tags on 7/26/2024 and presents today for postop recheck.  The patient's anal pain is decreased with defecation his bowel evacuation has improved.  Interestingly the patient says that the difficulty that he had voiding after his index hemorrhoidectomy after excision of the anal skin tag who is voiding bladder emptying has returned to normal  Review of Systems   Constitutional:  Negative for chills and fever.   HENT:  Negative for ear pain and sore throat.    Eyes:  Negative for pain and visual disturbance.   Respiratory:  Negative for cough and shortness of breath.    Cardiovascular:  Negative for chest pain and palpitations.   Gastrointestinal:  Positive for anal bleeding. Negative for abdominal pain and vomiting.   Genitourinary:  Negative for dysuria and hematuria.   Musculoskeletal:  Negative for arthralgias and back pain.   Skin:  Negative for color change and rash.   Neurological:  Negative for seizures and syncope.   All other systems reviewed and are negative.        Objective:     Colorectal Physical Exam     Inspection of the anal margin reveals healing of anal margin incisions with epithelialization wound healing reviewed with patient's wife during physical exam

## (undated) DEVICE — LIGHT HANDLE COVER SLEEVE DISP BLUE STELLAR

## (undated) DEVICE — TELFA NON-ADHERENT ABSORBENT DRESSING: Brand: TELFA

## (undated) DEVICE — X-RAY DETECTABLE SPONGES,16 PLY: Brand: VISTEC

## (undated) DEVICE — PAD GROUNDING DUAL ADULT

## (undated) DEVICE — POV-IOD SOLUTION 4OZ BT

## (undated) DEVICE — SUT CHROMIC 0 CTX 27 IN 864H

## (undated) DEVICE — NEEDLE 25G X 1 1/2

## (undated) DEVICE — 3M™ DURAPORE™ SURGICAL TAPE 1538-3, 3 INCH X 10 YARD (7,5CM X 9,1M), 4 ROLLS/BOX: Brand: 3M™ DURAPORE™

## (undated) DEVICE — SUT VICRYL 4-0 SH 27 IN J415H

## (undated) DEVICE — GAUZE SPONGES,16 PLY: Brand: CURITY

## (undated) DEVICE — GLOVE SRG BIOGEL 7.5

## (undated) DEVICE — MEDI-VAC NON-CONDUCTIVE SUCTION TUBING 6MM X 1.8M (6FT.) L: Brand: CARDINAL HEALTH

## (undated) DEVICE — PLUMEPEN PRO 10FT

## (undated) DEVICE — 4-PORT MANIFOLD: Brand: NEPTUNE 2

## (undated) DEVICE — BETHLEHEM UNIVERSAL MINOR GEN: Brand: CARDINAL HEALTH